# Patient Record
(demographics unavailable — no encounter records)

---

## 2024-10-26 NOTE — HISTORY OF PRESENT ILLNESS
[FreeTextEntry1] : ROSEANN GARZON is a 67 year old M who is s/p reconstruction of perineum, with gracilis myocutaneous flap and perineoplasty. DOS 10/16/24.  Patient is accompanied by his wife, Margarita.  Patient is POD#10. This is his first post-op visit. Patient was admitted at [facility] 10/16-10/22/24.  Patient's visiting nurse reached out to the office re c/f wound dehiscence, collateral obtained from patient and his wife Margarita, additional photo reviewed via secure email, appears to be epidermolysis, prescribed silvadene for wound care, which patient has been applying. Patient reports staying in a reclined position, using a cushion for support.  At this time, denies cp, sob, subjective fever, chills, and sweats.

## 2024-10-26 NOTE — DISCUSSION/SUMMARY
[TextEntry] : ROSEANN GARZON is a 67 year old M who is s/p reconstruction of perineum, with gracilis myocutaneous flap and perineoplasty. DOS 10/16/24.  Patient is overall healing well.  - Continue local wound care to perineum: apply silvadene cream + abd pad - OK to shower, get the incisions wet, pat dry, do not scrub - Continue to monitor JULIO drain output from right lower quadrant - Activity restrictions reviewed, OK to ambulate - F/u 2-3 weeks

## 2024-10-26 NOTE — PHYSICAL EXAM
[TextEntry] : Perineum: flap appears viable, no gross signs of infection, mild epidermolysis noted, silvadene cream applied + gauze + abd pad vicryl sutures intact, no open areas noted, no active bleeding or drainage from the wound  Left inner thigh: incision c/d/i, small open area to inferior aspect of incision from previous JULIO drain placement appears superficial, scant amt of serosang output, gauze pad applied  Abdomen: incision c/d/i, no gaps or open areas, staples intact +ostomy to Left mid quadrant with light brown liquid stool +JULIO drain to right lower quadrant with small amt of serous output

## 2024-10-26 NOTE — HISTORY OF PRESENT ILLNESS
[FreeTextEntry1] : ROSEANN GARZON is a 67 year old M who is s/p reconstruction of perineum, with gracilis myocutaneous flap and perineoplasty. DOS 10/16/24.  Patient is accompanied by his wife, Margarita.  Patient is POD#10. This is his first post-op visit. Patient was admitted at [facility] 10/16-10/22/24.  Patient's visiting nurse reached out to the office re c/f wound dehiscence, collateral obtained from patient and his wife Margarita, additional photo reviewed via secure email, appears to be epidermolysis, prescribed silvadene for wound care, which patient has been applying. Patient reports staying in a reclined position, using a cushion for support.  At this time, denies cp, sob, subjective fever, chills, and sweats.

## 2024-11-01 NOTE — PHYSICAL EXAM
[TextEntry] : Perineum: flapp appears viable, mild epidermolysis, no gross signs of infection, no surrounding erythema or drainage, silvadene cream + gauze + abd applied  Left inner thigh: incision c/d/i, previous open area from prior JULIO drain placement healed, no bleeding or drainage, prineo intact  Abdomen: incision c/d/i, +ostomy to left mi quadrant JULIO drain from right lower quadrant removed, pressure dressing applied

## 2024-11-01 NOTE — DISCUSSION/SUMMARY
[TextEntry] : ROSEANN GARZON is a 67 year old M who is s/p reconstruction of perineum, with gracilis myocutaneous flap and perineoplasty. DOS 10/16/24.  Patient is overall healing well. RLQ JULIO drain removed, pressure dressing applied.  - Continue local wound care to perineum: silvadene + abd daily and PRN when soiled - OK to shower, let the water run down, pat dry, do not scrub - Remove pressure dressing after 3 days - Activity restrictions reviewed, OK to sit for short period of time with donut cushion - F/u 3-4 weeks

## 2024-11-01 NOTE — HISTORY OF PRESENT ILLNESS
[FreeTextEntry1] : ROSEANN GARZON is a 67 year old M who is s/p reconstruction of perineum, with gracilis myocutaneous flap and perineoplasty. DOS 10/16/24.  Patient is accompanied by his wife, Margarita. Patient endorses tenderness to the JULIO insertion site, otherwise denies cp, sob, subjective fever, chills, and sweats. He has been applying silvadene to his perineal wound.  No drain log available for review however patient's wife reports minimal to no drainage for the past 2 days, plan to remove.

## 2024-11-07 NOTE — HISTORY OF PRESENT ILLNESS
[FreeTextEntry1] : 68 y/o M presents  PMH HTN, HLD, anemia, CAD, Anal SCC Medications: oral Mesalamine 4 tabs daily, mercaptopurine, rabeprazole, metronidazole 250 mg 4 tabs daily, losartan, atorvastatin, ASA 81 mg, metoprolol as needed (followed by Cardiologist Dr. Ramos) Former cigarette use x 40 years, quit in 2020 PSH appendectomy and right colon resection 2/2 ileitis (1970s), Coronary artery bypass x 3 (2020), cholecystectomy w/ Dr. Driscoll (3/2023), Diverting loop ileostomy creation  Father w/ stomach CA, mother w/ skin CA Last colonoscopy 2019 w/ Dr. Mccullough at [facility] in [place]  Per GI notes, h/o perianal fistulizing stricturing CD (diagnosed in 1978, h/o appendectomy and right colon resection, currently on Pentasa, 6MP, and metronidazole for 20+ years), Anal CA, s/p CRT September 2014 and radiation proctitis, followed by ONC Dr. Peewee Lynch.  Primary GI Dr. Mccullough, [facility] possible diverting colostomy in setting of fistulizing CD and stool incontinence in setting of radiation therapy.  Previous colonoscopy in 8/5/2019 revealed narrowing and ileum unable to be traversed per Dr. Mccullough who doesn't feel the lesion is amenable to dilation. Posterior anal fissure and internal hemorrhoids present. Radiation injury causing fibrosis noted in sigmoid colon and descending colon. With pediatric colonoscope, able to pass to cecum. Per report, transverse and right colon normal.  CTE completed 6/13/23 at [facility]: Stable postsurgical changes s/p ileocolic resection w/ reanastomosis. No bowel dilation or obstruction. Fat attentuation mural thickening involving neoterminal ileum, minimally changed. Oral contrast has not reached mid to distal colon which contained physiologic volume of stool. Chronic low attenuation sigmoid/rectal submucosal mural thickening minimally changed. Mild distal colonic diverticulosis. No acute inflammation. Peritoneum/Abdominal wall: post surgical changes along ventral wall  Per 8/2/23 note w/ GI [facility], discussed repeat endoscopy w/ possible dilation vs CRS consult for possible diversion. Advised given most pressing symptom is incontinence, dilation will not improve that symptom, referred to this office for consideration of diverting ostomy  Seen for initial consultation 8/24/23, reported h/o fistulas for several years which would intermittently discharge but have become active in the last year w/ reddish pus. Exam notable for right paramedian scar, periumbilical scar. Treatment options reviewed, patient to consider fecal diversion given symptomatology and prior history. Recommended pt consider proceeding with ileostomy to divert proximal to all areas of concern. Pt opted to consider his options and will contact office if he desires to proceed. Also opted to consult with his known surgeon to consider starting medication to treat post-cholecystectomy diarrhea.  Seen 09/12/2023 for f/u, patient interested in scheduling surgery for fecal diversion.  In interim, f/u with Ostomy nurses 09/25/2023 and 11/29/2023 for ostomy education.  S/p Diverting loop ileostomy creation 12/06/2023  Seen 12/27/23; Patient has been doing well. Consistency of ileostomy output varies, empties appliance at most 4 times per day. VNS assisting with ostomy education at home. On exam, abdomen soft, nontender, right paramedian scar, periumbilical scar, LLQ ileostomy pink and everted with green soft formed output, ostomy belt in place. Prescribed Imodium 2 mg 1 tab QID as needed for diarrhea  Pt follows w/ [facility] Dr. Peewee Lynch, CBC (2/15/24) noted hgb/hct 8.3/25.1, given Retacrit 2/9 and plans to continue q 4 weeks. Referred to Nephro Dr. Alejandro Washburn who pt saw yesterday, reports dehydrated and advised to start sodium bicarb supplements and advised to drink more juices and eat salty foods. Plans for Kidney US, scheduled at the end of March.  Seen 02/29/2024, for f/u, in interim had loop recorder placed and scheduled for cardiac ablation 3/6 at [facility]. Discussion of surgical management would be a total proctocolectomy via abdominal and perineal approaches with a permanent end ileostomy. Advised to f/u in 2-3 months while he gets medically optimized  Patient presents today in follow up. s/p cardiac ablation in 03/23 everything went well, saw Cardiologist about 2-3 weeks ago and had an Echo done which was normal. As per patient Losartan and Metoprolol was d/c after procedure. Denies anticoagulation. Patient also reports he had kidney ultrasound done and was told everything was okay.  Patient recently saw Heme Dr. Lynch for f/u on anemia, Hgb 10.8, Hct 33.6, .2, Creat. 1.27, Mg low (1.5) , AST 78 as per patient started on Sodium bicarbonate and Folic acid.  Last seen on 6/6/2024. Reports ostomy with frequent output needing to empty bag about 5-10x a day. Also reports having skin irritation due to leakage. Has seen Ostomy nurse and has been using OTC HC with some relief. States has been losing weight, he thinks due to high output. Since last visit has lost about 7-8 lbs. Taking imodium 3x/ day. Patient also reports anal fistulas have become active again since after surgery, reporting daily drainage (Yellowish, brown)  Remains on mesalamine and mercaptopurine. Exam noted right paramedian scar, periumbilical scar, LLQ ileostomy pink and everted with yellow/brown soft formed output, ostomy belt in place. Anorectal Exam noted minimal external hemorrhoids, multiple bilateral external openings consistent with complex perianal fistula. Surgical chat performed, patient consented for surgery.  10/16/24: s/p Total proctocolectomy with ileostomy and open lysis of intestinal adhesions by Dr. Chen and  s/p reconstruction of perineum, with gracilis myocutaneous flap and perineoplasty by Dr. Villalta Pathology: 1.  Abdominal colon and distal small bowel, resection: -   Mild chronic ileitis -   Mild chronic active colitis, consistent with history of Crohn'sdisease -   Negative for ulceration or dysplasia -   3 benign lymph nodes - Examination of the colon reveals features of chronic colitis including increased lymphoplasmacytic infiltrate in lamina propria, irregularly-shaped crypts, and crypt dropout.  Acute inflammation is also  present with rare crypt abscesses present.  Overall, the chronic active colitis is mild.  There is no dysplasia identified.  2.  Sigmoid colon, rectum, anus, excision: -   Mild chronic active colitis with focal erosions of surface mucosa, consistent with history of Crohn's disease -   Stenosis of colonic lumen -   Focus in anal canal with extensive fibrosis and cytologic atypia (radiation atypia), consistent with tumor bed site -   Perianal skin with chronic inflammation -   Fibrous adhesions -   Inflammatory, partially calcified debris present in colonic lumen (grossly identified is pseudopolyps) -   Negative for ulceration or dysplasia -   10 benign lymph nodes -   Examination of the colon reveals the same microscopic features seen in specimen 1.  A fibrotic tumor bed is located in the anal canal and entirely submitted (patient has history of anal cancer, status post chemotherapy and radiation).  Patient was discharged home on 10/22/24.  Patient seen for post op follow up and wound care by Dr. Villalta on 10/26 and 11/1,  Incision site healing well. RLQ JULIO drain removed by Dr. Villalta on 11/1 due to reported low JULIO output. Plan to continue local wound care to perineum: silvadene and ABD    Patient presents today for post op follow up. Admits to poor appetite. Denies N/V. Able to tolerate food.   States had a slight temperature last night up to 101. Took some tynenol and resolved.   Initially had pain at RLQ JULIO site last week. Was removed by Dr. Villalta and pain resolved.  Currently main complaint of rectal pain. Pain is 7/10. Denies taking any pain medication today.  Usually will take a percocet at bed time. Was prescribed 30 day supply of percocet by hematologist.   Reports visiting nurse contacted Dr. Villalta's office this past Monday 11/4/24 with concerns of infection and necrosis in the perineal incision site.  Reports slight brown drainage in the perineal incision site. Has been applying silvadene.  Has follow up visit with Dr. Villalta 11/9/24.  Reports good ostomy output. Stool is brown and pasty.  Is currently managing ostomy care well with his wife.

## 2024-11-07 NOTE — PHYSICAL EXAM
[FreeTextEntry1] : Medical assistant present for duration of physical examination  General no acute distress, alert and oriented Psych responding appropriately to questions Comfortable in chair Ambulating with and without assistance Nonlabored breathing Abdomen soft, nontender, right paramedian scar, periumbilical scar, LLQ ileostomy pink and everted with yellow/brown soft formed output, ostomy belt in place, midline incision intact. Staples removed and steri strips applied   Perineal Exam: Inspection no cellulitis , sloughing of superficial layer with pink healthy appearing tissue underneath, vicryl sutures with some separation of wound edges    Patient tolerated examination well.

## 2024-11-07 NOTE — HISTORY OF PRESENT ILLNESS
[FreeTextEntry1] : 68 y/o M presents  PMH HTN, HLD, anemia, CAD, Anal SCC Medications: oral Mesalamine 4 tabs daily, mercaptopurine, rabeprazole, metronidazole 250 mg 4 tabs daily, losartan, atorvastatin, ASA 81 mg, metoprolol as needed (followed by Cardiologist Dr. Ramos) Former cigarette use x 40 years, quit in 2020 PSH appendectomy and right colon resection 2/2 ileitis (1970s), Coronary artery bypass x 3 (2020), cholecystectomy w/ Dr. Driscoll (3/2023), Diverting loop ileostomy creation  Father w/ stomach CA, mother w/ skin CA Last colonoscopy 2019 w/ Dr. Mccullough at [facility] in [place]  Per GI notes, h/o perianal fistulizing stricturing CD (diagnosed in 1978, h/o appendectomy and right colon resection, currently on Pentasa, 6MP, and metronidazole for 20+ years), Anal CA, s/p CRT September 2014 and radiation proctitis, followed by ONC Dr. Peewee Lynch.  Primary GI Dr. Mccullough, [facility] of possible diverting colostomy in setting of fistulizing CD and stool incontinence in setting of radiation therapy.  Previous colonoscopy in 8/5/2019 revealed narrowing and ileum unable to be traversed per Dr. Mccullough who doesn't feel the lesion is amenable to dilation. Posterior anal fissure and internal hemorrhoids present. Radiation injury causing fibrosis noted in sigmoid colon and descending colon. With pediatric colonoscope, able to pass to cecum. Per report, transverse and right colon normal.  CTE completed 6/13/23 at [facility]: Stable postsurgical changes s/p ileocolic resection w/ reanastomosis. No bowel dilation or obstruction. Fat attentuation mural thickening involving neoterminal ileum, minimally changed. Oral contrast has not reached mid to distal colon which contained physiologic volume of stool. Chronic low attenuation sigmoid/rectal submucosal mural thickening minimally changed. Mild distal colonic diverticulosis. No acute inflammation. Peritoneum/Abdominal wall: post surgical changes along ventral wall  Per 8/2/23 note w/ GI [facility], discussed repeat endoscopy w/ possible dilation vs CRS consult for possible diversion. Advised given most pressing symptom is incontinence, dilation will not improve that symptom, referred to this office for consideration of diverting ostomy  Seen for initial consultation 8/24/23, reported h/o fistulas for several years which would intermittently discharge but have become active in the last year w/ reddish pus. Exam notable for right paramedian scar, periumbilical scar. Treatment options reviewed, patient to consider fecal diversion given symptomatology and prior history. Recommended pt consider proceeding with ileostomy to divert proximal to all areas of concern. Pt opted to consider his options and will contact office if he desires to proceed. Also opted to consult with his known surgeon to consider starting medication to treat post-cholecystectomy diarrhea.  Seen 09/12/2023 for f/u, patient interested in scheduling surgery for fecal diversion.  In interim, f/u with Ostomy nurses 09/25/2023 and 11/29/2023 for ostomy education.  S/p Diverting loop ileostomy creation 12/06/2023  Seen 12/27/23; Patient has been doing well. Consistency of ileostomy output varies, empties appliance at most 4 times per day. VNS assisting with ostomy education at home. On exam, abdomen soft, nontender, right paramedian scar, periumbilical scar, LLQ ileostomy pink and everted with green soft formed output, ostomy belt in place. Prescribed Imodium 2 mg 1 tab QID as needed for diarrhea  Pt follows w/ [facility] Dr. Peewee Lynch, CBC (2/15/24) noted hgb/hct 8.3/25.1, given Retacrit 2/9 and plans to continue q 4 weeks. Referred to Nephro Dr. Alejandro Washburn who pt saw yesterday, reports dehydrated and advised to start sodium bicarb supplements and advised to drink more juices and eat salty foods. Plans for Kidney US, scheduled at the end of March.  Seen 02/29/2024, for f/u, in interim had loop recorder placed and scheduled for cardiac ablation 3/6 at [facility]. Discussion of surgical management would be a total proctocolectomy via abdominal and perineal approaches with a permanent end ileostomy. Advised to f/u in 2-3 months while he gets medically optimized  Patient presents today in follow up. s/p cardiac ablation in 03/23 everything went well, saw Cardiologist about 2-3 weeks ago and had an Echo done which was normal. As per patient Losartan and Metoprolol was d/c after procedure. Denies anticoagulation. Patient also reports he had kidney ultrasound done and was told everything was okay.  Patient recently saw Heme Dr. Lynch for f/u on anemia, Hgb 10.8, Hct 33.6, .2, Creat. 1.27, Mg low (1.5) , AST 78 as per patient started on Sodium bicarbonate and Folic acid.  Last seen on 6/6/2024. Reports ostomy with frequent output needing to empty bag about 5-10x a day. Also reports having skin irritation due to leakage. Has seen Ostomy nurse and has been using OTC HC with some relief. States has been losing weight, he thinks due to high output. Since last visit has lost about 7-8 lbs. Taking imodium 3x/ day. Patient also reports anal fistulas have become active again since after surgery, reporting daily drainage (Yellowish, brown)  Remains on mesalamine and mercaptopurine. Exam noted right paramedian scar, periumbilical scar, LLQ ileostomy pink and everted with yellow/brown soft formed output, ostomy belt in place. Anorectal Exam noted minimal external hemorrhoids, multiple bilateral external openings consistent with complex perianal fistula. Surgical chat performed, patient consented for surgery.  10/16/24: s/p Total proctocolectomy with ileostomy and open lysis of intestinal adhesions by Dr. Chen and  s/p reconstruction of perineum, with gracilis myocutaneous flap and perineoplasty by Dr. Villalta Pathology: 1.  Abdominal colon and distal small bowel, resection: -   Mild chronic ileitis -   Mild chronic active colitis, consistent with history of Crohn'sdisease -   Negative for ulceration or dysplasia -   3 benign lymph nodes - Examination of the colon reveals features of chronic colitis including increased lymphoplasmacytic infiltrate in lamina propria, irregularly-shaped crypts, and crypt dropout.  Acute inflammation is also  present with rare crypt abscesses present.  Overall, the chronic active colitis is mild.  There is no dysplasia identified.  2.  Sigmoid colon, rectum, anus, excision: -   Mild chronic active colitis with focal erosions of surface mucosa, consistent with history of Crohn's disease -   Stenosis of colonic lumen -   Focus in anal canal with extensive fibrosis and cytologic atypia (radiation atypia), consistent with tumor bed site -   Perianal skin with chronic inflammation -   Fibrous adhesions -   Inflammatory, partially calcified debris present in colonic lumen (grossly identified is pseudopolyps) -   Negative for ulceration or dysplasia -   10 benign lymph nodes -   Examination of the colon reveals the same microscopic features seen in specimen 1.  A fibrotic tumor bed is located in the anal canal and entirely submitted (patient has history of anal cancer, status post chemotherapy and radiation).  Patient was discharged home on 10/22/24.  Patient seen for post op follow up and wound care by Dr. Villalta on 10/26 and 11/1,  Incision site healing well. RLQ JULIO drain removed by Dr. Villalta on 11/1 due to reported low JULIO output. Plan to continue local wound care to perineum: silvadene and ABD    Patient presents today for post op follow up. Admits to poor appetite. Denies N/V. Able to tolerate food.   States had a slight temperature last night up to 101. Took some tynenol and resolved.   Initially had pain at RLQ JULIO site last week. Was removed by Dr. Villalta and pain resolved.  Currently main complaint of rectal pain. Pain is 7/10. Denies taking any pain medication today.  Usually will take a percocet at bed time. Was prescribed 30 day supply of percocet by hematologist.   Reports visiting nurse contacted Dr. Villalta's office this past Monday 11/4/24 with concerns of infection and necrosis in the perineal incision site.  Reports slight brown drainage in the perineal incision site. Has been applying silvadene.  Has follow up visit with Dr. Villalta 11/9/24.  Reports good ostomy output. Stool is brown and pasty.  Is currently managing ostomy care well with his wife.

## 2024-11-07 NOTE — ASSESSMENT
[FreeTextEntry1] : Pathology discussed Continue to liberalize diet Continue to avoid heavy lifting and strenuous activity for 6-8 weeks postop Ostomy care. Local wound care per plastic surgery - has follow up 11/9/24 Telehealth 1 month. All questions were answered, patient expressed understanding, and is agreeable to this plan.

## 2024-11-11 NOTE — DISCUSSION/SUMMARY
[TextEntry] : ROSEANN GARZON is a 67 year old M who is s/p reconstruction of perineum, with gracilis myocutaneous flap and perineoplasty. DOS 10/16/24.  Patient is overall healing well. Skin paddle debrided with viable muscle noted.   - Continue local wound care to perineum: wet-to-dry dressing BID - OK to shower, let the water run down, pat dry - Ok to sit - PT - F/u 3-4 weeks

## 2024-11-11 NOTE — PHYSICAL EXAM
[TextEntry] : Perineum: flap skin is necrotic, debrided today, viable muscle, no gross signs of infection, no surrounding erythema  wet-to dry dressing applied  Left inner thigh: incision c/d/i, previous with small open area at middle of incision with serous drainage, prineo removed. rest of the incision cdi, no fluctuance  Abdomen: incision c/d/i, +ostomy to left mid quadrant

## 2024-11-11 NOTE — HISTORY OF PRESENT ILLNESS
[FreeTextEntry1] : ROSEANN GARZON is a 67 year old M who is s/p reconstruction of perineum, with gracilis myocutaneous flap and perineoplasty. DOS 10/16/24.  Patient is accompanied by his wife, Margarita. Patient had follow up with Dr Chen earlier this week, Reports drainage from perineum, has been applying silvadene. Reports drainage from thigh incision.  Denies fever, chills

## 2024-11-23 NOTE — HISTORY OF PRESENT ILLNESS
[FreeTextEntry1] : ROSEANN GARZON is a 67 year old M who is s/p reconstruction of perineum, with gracilis myocutaneous flap and perineoplasty. DOS 10/16/24.  Patient is accompanied by his wife, Margarita. Patient continues to endorse scant amt of drainage from his left incision, otherwise eating better, getting his appetite back, denies cp, sob, subjective fever, chills, and sweats. Patient self-discontinued PO antibiotics due to myalgia and diarrhea, which resolved after he stopped the abx.

## 2024-11-23 NOTE — DISCUSSION/SUMMARY
[TextEntry] : ROSEANN GARZON is a 67 year old M who is s/p reconstruction of perineum, with gracilis myocutaneous flap and perineoplasty. DOS 10/16/24.  Patient is overall healing well. Perineum skin paddle with healthy granulation tissue.   - Continue local wound care to perineum: wet-to-dry dressing BID - OK to shower, let the water run down, pat dry - Ok to sit - Continue home PT - F/u 3-4 weeks

## 2024-12-04 NOTE — END OF VISIT
Caller: Estella Kaur    Relationship: Self         What was the call regarding: CONFLICT           [Time Spent: ___ minutes] : I have spent [unfilled] minutes of time on the encounter which excludes teaching and separately reported services.

## 2024-12-04 NOTE — PHYSICAL EXAM
[Alert] : alert [Normal Voice/Communication] : normal voice/communication [Healthy Appearing] : healthy appearing [Well Developed] : well developed

## 2024-12-06 NOTE — CONSULT LETTER
[Dear  ___] : Dear  [unfilled], [Consult Letter:] : I had the pleasure of evaluating your patient, [unfilled]. [Please see my note below.] : Please see my note below. [Consult Closing:] : Thank you very much for allowing me to participate in the care of this patient.  If you have any questions, please do not hesitate to contact me. [Sincerely,] : Sincerely, [DrDelmar  ___] : Dr. GVAIN [FreeTextEntry3] : Jorgito Land MD\par  Professor of Medicine\par  Chief of GI\par  Director IBD Program\par  [facility]\par

## 2024-12-06 NOTE — CONSULT LETTER
[Dear  ___] : Dear  [unfilled], [Consult Letter:] : I had the pleasure of evaluating your patient, [unfilled]. [Please see my note below.] : Please see my note below. [Consult Closing:] : Thank you very much for allowing me to participate in the care of this patient.  If you have any questions, please do not hesitate to contact me. [Sincerely,] : Sincerely, [DrDelmar  ___] : Dr. GAVIN [FreeTextEntry3] : Jorgito Land MD\par  Professor of Medicine\par  Chief of GI\par  Director IBD Program\par  [facility]\par

## 2024-12-06 NOTE — HISTORY OF PRESENT ILLNESS
[Home] : at home, [unfilled] , at the time of the visit. [Medical Office: ([facility])___] : at the medical office located in  [Verbal consent obtained from patient] : the patient, [unfilled] [FreeTextEntry1] : This is a 67 year old male with perianal fistulizing stricturing Crohn's Disease (diagnosed 1978, right sided colon resection in the past, currently off his prior treatment Pentasa, 6MP, and metronidazole for 20+ years), anal cancer (completed CT/RT in September 2014), and radiation proctitis who presents today s/p surgery on 10/14/24, to discuss medical management of IBD.  10/14/24:  Underwent Total proctocolectomy with ileostomy and open lysis of adhesions by Dr. Chen and s/p reconstruction of perineum with gracilis myocutaneous flap and perineoplasty by Dr. Villalta   Pt reports since surgery, he reports a slow recovery but overall feeling well. Some soreness remains at the rectal flap site. Ostomy output ~ 800-900 mL today, stable. No skin manifestations. No abd pain. No fevers/chills. Tolerating PO. His wife assists with his medical care and meds. Denies narcotic use, using Tylenol for pain management. Able to walk outside a little. Has been off his IBD meds since surgery on 10/14/24. Reports he's had recurrent skin cancers over the last 10 years. Goals are to minimize IBD meds and avoid skin cancer.   PATH:  Specimen(s) Submitted 1  Abdominal colon and distal small bowel 2  Sigmoid colon, rectum and anus  Final Diagnosis 1.  Abdominal colon and distal small bowel, resection: -   Mild chronic ileitis -   Mild chronic active colitis, consistent with history of Crohn's  disease -   Negative for ulceration or dysplasia -   3 benign lymph nodes -   See note  Note: Examination of the colon reveals features of chronic colitis including increased lymphoplasmacytic infiltrate in lamina propria, irregularly-shaped crypts, and crypt dropout.  Acute inflammation is also present with rare crypt abscesses present.  Overall, the chronic active colitis is mild.  There is no dysplasia identified.  2.  Sigmoid colon, rectum, anus, excision: -   Mild chronic active colitis with focal erosions of surface mucosa, consistent with history of Crohn's disease -   Stenosis of colonic lumen -   Focus in anal canal with extensive fibrosis and cytologic atypia (radiation atypia), consistent with tumor bed site -   Perianal skin with chronic inflammation -   Fibrous adhesions -   Inflammatory, partially calcified debris present in colonic lumen (grossly identified is pseudopolyps) -   Negative for ulceration or dysplasia -   10 benign lymph nodes -   See note no  Note: Examination of the colon reveals the same microscopic features seen in specimen 1.  A fibrotic tumor bed is located in the anal canal and entirely submitted (patient has history of anal cancer, status post chemotherapy and radiation).   to discuss his CTE results. Patient has been referred to our clinic by Dr. Mccullough (gastroenterology) for a second opinion in the discussion of possible diverting colostomy given complicated history with fistulizing Crohn's disease as well as stool incontinence in the setting of radiation therapy. CTE from 6/13/2023 demonstrates no evidence for metastatic disease or acute inflammatory changes. When discussing symptoms, he describes having increasing stool frequency/incontinence. Typically lasts until around noon, but now lasts well into the dinner hour. Bowel movements are non-bloody and non-painful. Denies nausea, vomiting, fever, or extraintestinal manifestations.

## 2024-12-06 NOTE — ASSESSMENT
[FreeTextEntry1] : This is a 67 year old male with perianal fistulizing stricturing Crohn's Disease (diagnosed 1978, right sided colon resection in the past, currently off his prior treatment Pentasa, 6MP, and metronidazole for 20+ years), anal cancer (completed CT/RT in September 2014), and radiation proctitis who presents today s/p surgery on 10/14/24, to discuss medical management of IBD.  10/14/24:  Underwent Total proctocolectomy with ileostomy and open lysis of adhesions by Dr. Chen and s/p reconstruction of perineum with gracilis myocutaneous flap and perineoplasty by Dr. Villalta   Plan:  - given evidence of ileitis on pathology and severity of history of Crohn's involving small bowel as well, advised that he restart treatment to prevent long-term recurrence of IBD  - advised he undergo baseline Video Capsule Endoscopy to establish baseline IBD activity prior to starting therapy; confirmed with Dr. Chen no contraindications due to ileostomy status - discussed risk/benefit of therapy briefly however he will consult with his primary GI to discuss further options - we advise he consider Ustekinumab (Stelara) in consideration of his IBD and his overall goals  - will need prebiologic work up (Hepatitis panel, TB) with Dr. Mccullough prior to starting  - avoid 6MP and anti-TNF given history of skin cancer - avoid ALBINO-inhibitors given significant cardiac history of triple bypass  - RD referral to assist with gaining weight since surgery and overall IBD nutrition   #History of anal cancer (likely Squamous Cell Ca) in 2014 - Reports surveillance via lab work q 3 months with Oncologist  - Oncologist: Dr. Peewee Lynch   #Anemia - B12 and iron deficient- being f/b Heme/Onc Dr. Lynch  Follow up 6 months to check in after he discusses above plan and implementation with Dr. Mccullough  Coordinated care with Dr. Chen and Dr. Mccullough personally.

## 2024-12-06 NOTE — HISTORY OF PRESENT ILLNESS
[Home] : at home, [unfilled] , at the time of the visit. [Medical Office: ([facility])___] : at the medical office located in  [Verbal consent obtained from patient] : the patient, [unfilled] [FreeTextEntry1] : This is a 67 year old male with perianal fistulizing stricturing Crohn's Disease (diagnosed 1978, right sided colon resection in the past, currently off his prior treatment Pentasa, 6MP, and metronidazole for 20+ years), anal cancer (completed CT/RT in September 2014), and radiation proctitis who presents today s/p surgery on 10/14/24, to discuss medical management of IBD.  10/14/24:  Underwent Total proctocolectomy with ileostomy and open lysis of adhesions by Dr. Chen and s/p reconstruction of perineum with gracilis myocutaneous flap and perineoplasty by Dr. Villalta   Pt reports since surgery, he reports a slow recovery but overall feeling well. Some soreness remains at the rectal flap site. Ostomy output ~ 800-900 mL today, stable. No skin manifestations. No abd pain. No fevers/chills. Tolerating PO. His wife assists with his medical care and meds. Denies narcotic use, using Tylenol for pain management. Able to walk outside a little. Has been off his IBD meds since surgery on 10/14/24. Reports he's had recurrent skin cancers over the last 10 years. Goals are to minimize IBD meds and avoid skin cancer.   PATH:  Specimen(s) Submitted 1  Abdominal colon and distal small bowel 2  Sigmoid colon, rectum and anus  Final Diagnosis 1.  Abdominal colon and distal small bowel, resection: -   Mild chronic ileitis -   Mild chronic active colitis, consistent with history of Crohn's  disease -   Negative for ulceration or dysplasia -   3 benign lymph nodes -   See note  Note: Examination of the colon reveals features of chronic colitis including increased lymphoplasmacytic infiltrate in lamina propria, irregularly-shaped crypts, and crypt dropout.  Acute inflammation is also present with rare crypt abscesses present.  Overall, the chronic active colitis is mild.  There is no dysplasia identified.  2.  Sigmoid colon, rectum, anus, excision: -   Mild chronic active colitis with focal erosions of surface mucosa, consistent with history of Crohn's disease -   Stenosis of colonic lumen -   Focus in anal canal with extensive fibrosis and cytologic atypia (radiation atypia), consistent with tumor bed site -   Perianal skin with chronic inflammation -   Fibrous adhesions -   Inflammatory, partially calcified debris present in colonic lumen (grossly identified is pseudopolyps) -   Negative for ulceration or dysplasia -   10 benign lymph nodes -   See note no  Note: Examination of the colon reveals the same microscopic features seen in specimen 1.  A fibrotic tumor bed is located in the anal canal and entirely submitted (patient has history of anal cancer, status post chemotherapy and radiation).   to discuss his CTE results. Patient has been referred to our clinic by Dr. Mccullough (gastroenterology) for a second opinion in the discussion of possible diverting colostomy given complicated history with fistulizing Crohn's disease as well as stool incontinence in the setting of radiation therapy. CTE from 6/13/2023 demonstrates no evidence for metastatic disease or acute inflammatory changes. When discussing symptoms, he describes having increasing stool frequency/incontinence. Typically lasts until around noon, but now lasts well into the dinner hour. Bowel movements are non-bloody and non-painful. Denies nausea, vomiting, fever, or extraintestinal manifestations.

## 2024-12-15 NOTE — REVIEW OF SYSTEMS
[de-identified] : sensitive, tender possibly infected wound site on top right buttock  [FreeTextEntry1] : A detailed set of ROS were asked and negative except those outlined in HPI.

## 2024-12-15 NOTE — HISTORY OF PRESENT ILLNESS
[FreeTextEntry1] : ROSEANN GARZON is a 67 year old M who is s/p reconstruction of perineum, with gracilis myocutaneous flap and perineoplasty. DOS 10/16/24.  Patient is accompanied by his wife, Margarita.  Patient reports a "pimple" in the wound area, +sensitive to touch, wakes him up from sleep. Patient has been applying aquaphor to the wound, visiting nurse has been performing his dressing changes. Reports the thigh incision is improved. Patient endorses fear of wound dehiscence and does not sit on bowl? Patient also reports BLE swelling with feet skin cracks, s/p US lower extremity, neg for DVT.  Patient was evaluated by GI Dr. Land for possible initiation of biologic ustekinumab for IBD.  At this time, denies cp, sob, subjective fever, chills, and sweats.

## 2024-12-15 NOTE — REVIEW OF SYSTEMS
[de-identified] : sensitive, tender possibly infected wound site on top right buttock  [FreeTextEntry1] : A detailed set of ROS were asked and negative except those outlined in HPI.

## 2024-12-15 NOTE — PHYSICAL EXAM
[TextEntry] : Perineum: flap skin with healthy granulation tissue, no necrosis visible,  +area of erythema superior to right buttock, appears to be localized, WTD dressing applied + abd pad  Left inner thigh: incision is well healing, no fluctuance, low suspicion of fluid collection

## 2024-12-15 NOTE — ASSESSMENT
[FreeTextEntry1] : ROSEANN GARZON is a 67 year old M who is s/p reconstruction of perineum, with gracilis myocutaneous flap and perineoplasty. DOS 10/16/24.  Patient is overall healing well. Perineum skin paddle with healthy granulation tissue.  Localized area of infection to right buttock, rec abx.   - Continue local wound care to perineum: wet-to-dry dressing BID - OK to shower, let the water run down, pat dry - Ok to sit, rec using donut cushion for exgtra support - Advised elevate legs and use compression socks for b/l lower extremity swelling, continue moisturizing feet - Continue home PT - PO abx x 7 days - F/u 3-4 weeks

## 2024-12-15 NOTE — ADDENDUM
[FreeTextEntry1] : This note was authored by Sabi De working as a scribe for Dr.Victor Villalta.  I, Kd Villalta NP, am scribing for and in the presence of Dr. Trung Villalta MD, the following sections: HISTORY OF PRESENT ILLNESS, PAST MEDICAL/FAMILY/SOCIAL HISTORY, REVIEW OF SYSTEMS, VITRAL SIGNS, PHYSICAL EXAM, & DISPOSITION.  I personally performed the services described in the documentation, reviewed the documentation recorded by the scribe in my presence and it accurately and completely records my words and actions.

## 2024-12-15 NOTE — ADDENDUM
[FreeTextEntry1] : This note was authored by Sabi De working as a scribe for Dr.Victor Villalta.  I, Kd Villlata NP, am scribing for and in the presence of Dr. Trung Villalta MD, the following sections: HISTORY OF PRESENT ILLNESS, PAST MEDICAL/FAMILY/SOCIAL HISTORY, REVIEW OF SYSTEMS, VITRAL SIGNS, PHYSICAL EXAM, & DISPOSITION.  I personally performed the services described in the documentation, reviewed the documentation recorded by the scribe in my presence and it accurately and completely records my words and actions.

## 2024-12-15 NOTE — REVIEW OF SYSTEMS
[de-identified] : sensitive, tender possibly infected wound site on top right buttock  [FreeTextEntry1] : A detailed set of ROS were asked and negative except those outlined in HPI.

## 2025-01-11 NOTE — ADDENDUM
[FreeTextEntry1] : I, Jennifer Lange, documented this note as a scribe on behalf of Dr. Trung Villalta MD on 01/11/2025.  I, Kd Villalta NP, am scribing for and in the presence of Dr. Trung Villalta MD, the following sections: HISTORY OF PRESENT ILLNESS, PAST MEDICAL/FAMILY/SOCIAL HISTORY, REVIEW OF SYSTEMS, VITAL SIGNS, PHYSICAL EXAM, & DISPOSITION.   I personally performed the services described in the documentation, reviewed the documentation recorded by the NP/scribe in my presence and it accurately and completely records my words and actions.

## 2025-01-11 NOTE — ASSESSMENT
[FreeTextEntry1] : ROSEANN GARZON is a 67 year old M who is s/p reconstruction of perineum, with gracilis myocutaneous flap and perineoplasty. DOS 10/16/24.  Patient is healing well. Perineum skin paddle with significant re-epithelialization and overall improvement.  Exam findings discussed with patient, high risk for developing pressure ulcer, rec alternating positions q2h, avoid sitting for prolonged periods of time, use donut cushion, etc.  - Offered physical therapy, patient will let us know - Local wound care to superficial open areas: apply bacitracin ointment + abd pad - F/u 2-3 months

## 2025-01-11 NOTE — HISTORY OF PRESENT ILLNESS
[FreeTextEntry1] : ROSEANN GARZON is a 67 year old M who is s/p reconstruction of perineum, with gracilis myocutaneous flap and perineoplasty. DOS 10/16/24.   Patient is accompanied by his wife.  Patient was recently admitted at [facility] in  for bacteremia, source was his chemoport which has since been removed, discharged on IV abx x 2 weeks.  At this time, patient endorses overall improvement of the wound with minimal drainage, denies cp, sob, subjective fever, chills, and sweats.

## 2025-01-11 NOTE — PHYSICAL EXAM
[TextEntry] : Perineum: flap skin with >85% re-epithelialization, incisions healing well, no necrosis small area of erythema to right side of sacrum bone, suspect early pressure sore,   Sacrum: skin with stigmata of xrt, small circular open areas with no active drainage, appear superficial  Left inner thigh: incision is well healing, no fluctuance,

## 2025-01-11 NOTE — HISTORY OF PRESENT ILLNESS
[FreeTextEntry1] : ROSEANN GARZON is a 67 year old M who is s/p reconstruction of perineum, with gracilis myocutaneous flap and perineoplasty. DOS 10/16/24.   Patient is accompanied by his wife.  Patient was recently admitted at  in  for bacteremia, source was his chemoport which has since been removed, discharged on IV abx x 2 weeks.  At this time, patient endorses overall improvement of the wound with minimal drainage, denies cp, sob, subjective fever, chills, and sweats.

## 2025-02-20 NOTE — PROCEDURE
[FreeTextEntry1] : 1) eczema to bilateral ankles: aseptic surgical debridement performed to lesions, patient recommended use of topical moisturizer daily with manual scrubbing nightly and repeat regimen consistently until cleared 2) Onychomycosis with elongated painful toenails Left and Right hallux: aseptic debridement of elongated mycotic painful toenails x 2  Patient tolerated all treatments well and without any complications

## 2025-02-20 NOTE — ASSESSMENT
[FreeTextEntry1] : 1) eczema to bilateral ankles: aseptic surgical debridement performed to lesions, patient recommended use of topical moisturizer daily with manual scrubbing nightly and repeat regimen consistently until cleared 2) Onychomycosis with elongated painful toenails Left and Right hallux: aseptic debridement of elongated mycotic painful toenails x 2 3) Hammertoes: discussed use of proper fit high toe box shoes with accommodative insoles, check feet for any new lesions daily, seek treatment immediately if present  Patient tolerated all treatments well and without any complications Follow up in 2 months [Verbal] : verbal [Patient] : patient [Good - alert, interested, motivated] : Good - alert, interested, motivated [Verbalizes knowledge/Understanding] : Verbalizes knowledge/understanding [Skin Care] : skin care [Pt responsibility to plan of care] : patient responsibility to plan of care

## 2025-02-20 NOTE — REASON FOR VISIT
[Follow-Up Visit] : a follow-up visit for [FreeTextEntry2] : eczema to bilateral ankles, onychomycosis to Left and Right hallux, loydaes

## 2025-02-20 NOTE — PHYSICAL EXAM
[General Appearance - Alert] : alert [General Appearance - In No Acute Distress] : in no acute distress [General Appearance - Well Nourished] : well nourished [General Appearance - Well Developed] : well developed [General Appearance - Well-Appearing] : healthy appearing [] : normal voice and communication [Delayed in the Right Toes] : capillary refills delayed in the right toes [Delayed in the Left Toes] : capillary refills delayed in the left toes [Skin Turgor] : normal skin turgor [Foot Ulcer] : no foot ulcer [Position Sense Dec.] : diminished position sense at the level of the toes [Oriented To Time, Place, And Person] : oriented to person, place, and time [Impaired Insight] : insight and judgment were intact [Affect] : the affect was normal

## 2025-02-20 NOTE — HISTORY OF PRESENT ILLNESS
[FreeTextEntry1] : Mr. ROSEANN GARZON is a 68 year M who is seen in the office for eczema to bilateral ankles, onychomycosis to Left and Right hallux, hammertoes. Patient denies any current or recent f/c/n/v/sob/chest pain. AAOx3 and in NAD.

## 2025-03-29 NOTE — ASSESSMENT
[FreeTextEntry1] : ROSEANN GARZON is a 68 year old M who is s/p reconstruction of perineum, with gracilis myocutaneous flap and perineoplasty. DOS  03/29/2025  Patient is overall healing well. Perineum skin paddle with healthy granulation tissue.  Localized area of infection to right buttock, rec abx.   - Continue local wound care to perineum: wet-to-dry dressing BID - OK to shower, let the water run down, pat dry - Ok to sit, rec using donut cushion for extra support - Advised elevate legs and use compression socks for b/l lower extremity swelling, continue moisturizing feet - C/o fistula in perineum area. - Continue monitoring Flare ups, watch for improvement on Stelera - Offered re-excision, reviewed surgical options - F/u 2-6 weeks or sooner PRN

## 2025-03-29 NOTE — HISTORY OF PRESENT ILLNESS
[FreeTextEntry1] : ROSEANN GARZON is a 67 year old M who is s/p reconstruction of perineum, with gracilis myocutaneous flap and perineoplasty. DOS  03/29/2025.  Patient is accompanied by his wife, Margarita.  Patient reports a "pimple" in the wound area, +sensitive to touch, wakes him up from sleep. Patient has been applying aquaphor to the wound.  Pt reports occasional discharge.  Pt seen by Dr Gastelum, seth Shen for Crohn's Patient also reports BLE swelling with feet skin cracks, s/p US lower extremity, neg for DVT.  At this time, denies cp, sob, subjective fever, chills, and sweats.

## 2025-03-29 NOTE — ADDENDUM
[FreeTextEntry1] : I, Marimar Tomas, acted solely as a scribe for ISAC Mendieta on 3/21/2025 . All medical entries made by the Scribe were at my, ISAC Mendieta's, direction and personally dictated by me on 03/29/2025 . I have reviewed the chart and agree that the record accurately reflects my personal performance of the history, physical exam, assessment and plan. I have also personally directed, reviewed, and agreed with the chart.

## 2025-04-23 NOTE — PHYSICAL EXAM
[General Appearance - Alert] : alert [General Appearance - In No Acute Distress] : in no acute distress [General Appearance - Well Nourished] : well nourished [General Appearance - Well Developed] : well developed [General Appearance - Well-Appearing] : healthy appearing [Ankle Swelling On The Left] : moderate [Delayed in the Right Toes] : capillary refills delayed in the right toes [Delayed in the Left Toes] : capillary refills delayed in the left toes [] : normal strength/tone [de-identified] : ROM of ankle, subtalar, midtarsal, MPJ, IPJ are adequate and nontender bilateral 5/5 muscle power in inversion, eversion, dorsiflexion, plantarflexion bilateral [Skin Turgor] : normal skin turgor [Foot Ulcer] : no foot ulcer [Position Sense Dec.] : diminished position sense at the level of the toes [FreeTextEntry1] : gross epicritic sensation to feet diminished, light touch sensation intact, sharp/dull sensation intact [Oriented To Time, Place, And Person] : oriented to person, place, and time [Impaired Insight] : insight and judgment were intact [Affect] : the affect was normal

## 2025-04-23 NOTE — REVIEW OF SYSTEMS
[Joint Swelling] : no joint swelling [Joint Stiffness] : no joint stiffness [Limb Pain] : no limb pain [Limb Swelling] : no limb swelling [Skin Lesions] : skin lesion [Skin Wound] : no skin wound [Itching] : no itching [Dry Skin] : dry skin [Confused] : no confusion [Convulsions] : no convulsions [Dizziness] : no dizziness [Fainting] : no fainting [Negative] : Psychiatric

## 2025-05-02 NOTE — ASSESSMENT
[FreeTextEntry1] : This is a 67 year old male with perianal fistulizing stricturing Crohn's Disease (diagnosed 1978, right sided colon resection in the past, currently off his prior treatment Pentasa, 6MP, and metronidazole for 20+ years), anal cancer (completed CT/RT in September 2014), and radiation proctitis who presents today s/p surgery on 10/14/24, and VCE Jan 2025 showing small bowel disease, now sp 1st infusion of UST.   10/14/24:  Underwent Total proctocolectomy with ileostomy and open lysis of adhesions by Dr. Chen and s/p reconstruction of perineum with gracilis myocutaneous flap and perineoplasty by Dr. Villalta  VCE Jan 2025 with small bowel disease.   Plan:  - given evidence of ileitis on pathology and severity of history of Crohn's involving small bowel as well, advised that he restart treatment to prevent long-term recurrence of IBD  - now S/P UST loading, plan for first maintenance; we advised pt use [employer] for RN administration of medication to assist with cpay coverage but pt prefers to use [facility] for at home delivery of syringes - avoid 6MP and anti-TNF given history of skin cancer - avoid ALBINO-inhibitors given significant cardiac history of triple bypass  - RD referral to assist with gaining weight since surgery and overall IBD nutrition  -plan for endoscopic eval 6 mo after starting treatment; spoke with dr scruggs and he prefers to do ileoscopy next month and then again 6 mo later   #History of anal cancer (likely Squamous Cell Ca) in 2014 - Reports surveillance via lab work q 3 months with Oncologist  - Oncologist: Dr. Peewee Lynch   #Anemia - B12 and iron deficient- being f/b Heme/Onc Dr. Lynch  Follow up as needed - Dr. Scruggs advised pt f/u with IBD team here annually

## 2025-05-02 NOTE — HISTORY OF PRESENT ILLNESS
[Home] : at home, [unfilled] , at the time of the visit. [Medical Office: ([facility])___] : at the medical office located in  [Verbal consent obtained from patient] : the patient, [unfilled] [FreeTextEntry1] : This is a 68 year old male with perianal fistulizing stricturing Crohn's Disease (diagnosed 1978, right sided colon resection in the past, currently off his prior treatment Pentasa, 6MP, and metronidazole for 20+ years), anal cancer (completed CT/RT in September 2014), and radiation proctitis who presents today s/p surgery on 10/14/24, now s/p 1st infusion of UST feeling well.   Pt denies any ADRs to UST, feeling overall well. Received at [place]. No labs to review.   VCE Jan 2025: small bowel capsule showed enlarged ulcerations presented throughout the small bowel. ulcerations of varying sizes present. small erosions and microsize bleeding erosions present throughout small bowel.   10/14/24:  Underwent Total proctocolectomy with ileostomy and open lysis of adhesions by Dr. Chen and s/p reconstruction of perineum with gracilis myocutaneous flap and perineoplasty by Dr. Villalta   Pt reports since surgery, he reports a slow recovery but overall feeling well. Some soreness remains at the rectal flap site. Ostomy output ~ 800-900 mL today, stable. No skin manifestations. No abd pain. No fevers/chills. Tolerating PO. His wife assists with his medical care and meds. Denies narcotic use, using Tylenol for pain management. Able to walk outside a little. Has been off his IBD meds since surgery on 10/14/24. Reports he's had recurrent skin cancers over the last 10 years. Goals are to minimize IBD meds and avoid skin cancer.   PATH:  Specimen(s) Submitted 1  Abdominal colon and distal small bowel 2  Sigmoid colon, rectum and anus  Final Diagnosis 1.  Abdominal colon and distal small bowel, resection: -   Mild chronic ileitis -   Mild chronic active colitis, consistent with history of Crohn's  disease -   Negative for ulceration or dysplasia -   3 benign lymph nodes -   See note  Note: Examination of the colon reveals features of chronic colitis including increased lymphoplasmacytic infiltrate in lamina propria, irregularly-shaped crypts, and crypt dropout.  Acute inflammation is also present with rare crypt abscesses present.  Overall, the chronic active colitis is mild.  There is no dysplasia identified.  2.  Sigmoid colon, rectum, anus, excision: -   Mild chronic active colitis with focal erosions of surface mucosa, consistent with history of Crohn's disease -   Stenosis of colonic lumen -   Focus in anal canal with extensive fibrosis and cytologic atypia (radiation atypia), consistent with tumor bed site -   Perianal skin with chronic inflammation -   Fibrous adhesions -   Inflammatory, partially calcified debris present in colonic lumen (grossly identified is pseudopolyps) -   Negative for ulceration or dysplasia -   10 benign lymph nodes -   See note no  Note: Examination of the colon reveals the same microscopic features seen in specimen 1.  A fibrotic tumor bed is located in the anal canal and entirely submitted (patient has history of anal cancer, status post chemotherapy and radiation).   to discuss his CTE results. Patient has been referred to our clinic by Dr. Mccullough (gastroenterology) for a second opinion in the discussion of possible diverting colostomy given complicated history with fistulizing Crohn's disease as well as stool incontinence in the setting of radiation therapy. CTE from 6/13/2023 demonstrates no evidence for metastatic disease or acute inflammatory changes. When discussing symptoms, he describes having increasing stool frequency/incontinence. Typically lasts until around noon, but now lasts well into the dinner hour. Bowel movements are non-bloody and non-painful. Denies nausea, vomiting, fever, or extraintestinal manifestations.

## 2025-05-02 NOTE — CONSULT LETTER
[Dear  ___] : Dear  [unfilled], [Consult Letter:] : I had the pleasure of evaluating your patient, [unfilled]. [Please see my note below.] : Please see my note below. [Consult Closing:] : Thank you very much for allowing me to participate in the care of this patient.  If you have any questions, please do not hesitate to contact me. [Sincerely,] : Sincerely, [DrDelmar  ___] : Dr. GAVIN [FreeTextEntry3] : Jorgito Land MD\par  Professor of Medicine\par  Chief of GI\par  Director IBD Program\par  [facility]\par

## 2025-07-17 NOTE — PHYSICAL EXAM
[FreeTextEntry1] :  Medical assistant present for duration of physical examination  General no acute distress, alert and oriented Psych calm, pleasant demeanor, responding appropriately to question Thin Comfortable in chair Ambulating without assistance Nonlabored breathing Abdomen soft, nondistended, nontender, no rebound or guarding, midline laparotomy incision intact, blister of skin near umbilicus near ostomy appliance, LLQ JULIO with serous fluid in tubing, minimal in drain Perineum - right buttock JULIO with serous fluid in tubing, minimal in drain  Right buttock/pelvic pigtail removed today. Patient tolerated well and dressing applied. 
No

## 2025-07-17 NOTE — HISTORY OF PRESENT ILLNESS
[FreeTextEntry1] : 67 y/o M presents for follow up   PMH HTN, HLD, anemia, CAD, Anal SCC  Medications: oral Mesalamine 4 tabs daily, mercaptopurine, rabeprazole, metronidazole 250 mg 4 tabs daily, losartan, atorvastatin, ASA 81 mg, metoprolol as needed (followed by Cardiologist Dr. Ramos) Former cigarette use x 40 years, quit in 2020  PSH appendectomy and right colon resection 2/2 ileitis (1970s), Coronary artery bypass x 3 (2020), cholecystectomy w/ Dr. Driscoll (3/2023), Diverting loop ileostomy creation  Father w/ stomach CA, mother w/ skin CA Last colonoscopy 2019 w/ Dr. Mccullough at [facility] in [place]  Per GI notes, h/o perianal fistulizing stricturing CD (diagnosed in 1978, h/o appendectomy and right colon resection, currently on Pentasa, 6MP, and metronidazole for 20+ years), Anal CA, s/p CRT September 2014 and radiation proctitis, followed by ONC Dr. Peewee Lynch.  Primary GI Dr. Mccullough, referred to [facility] for 2nd opinion of possible diverting colostomy in setting of fistulizing CD and stool incontinence in setting of radiation therapy.  Previous colonoscopy in 8/5/2019 revealed narrowing and ileum unable to be traversed per Dr. Mccullough who doesn't feel the lesion is amenable to dilation. Posterior anal fissure and internal hemorrhoids present. Radiation injury causing fibrosis noted in sigmoid colon and descending colon. With pediatric colonoscope, able to pass to cecum. Per report, transverse and right colon normal.  CTE completed 6/13/23 at [facility]: Stable postsurgical changes s/p ileocolic resection w/ reanastomosis. No bowel dilation or obstruction. Fat attentuation mural thickening involving neoterminal ileum, minimally changed. Oral contrast has not reached mid to distal colon which contained physiologic volume of stool. Chronic low attenuation sigmoid/rectal submucosal mural thickening minimally changed. Mild distal colonic diverticulosis. No acute inflammation. Peritoneum/Abdominal wall: post surgical changes along ventral wall  Per 8/2/23 note w/ [facility], discussed repeat endoscopy w/ possible dilation vs CRS consult for possible diversion. Advised given most pressing symptom is incontinence, dilation will not improve that symptom, referred to this office for consideration of diverting ostomy  Seen for initial consultation 8/24/23, reported h/o fistulas for several years which would intermittently discharge but have become active in the last year w/ reddish pus. Exam notable for right paramedian scar, periumbilical scar. Treatment options reviewed, patient to consider fecal diversion given symptomatology and prior history. Recommended pt consider proceeding with ileostomy to divert proximal to all areas of concern. Pt opted to consider his options and will contact office if he desires to proceed. Also opted to consult with his known surgeon to consider starting medication to treat post-cholecystectomy diarrhea.  Seen 09/12/2023 for f/u, patient interested in scheduling surgery for fecal diversion.  In interim, f/u with Ostomy nurses 09/25/2023 and 11/29/2023 for ostomy education.  S/p Diverting loop ileostomy creation 12/06/2023  Seen 12/27/23; Patient has been doing well. Consistency of ileostomy output varies, empties appliance at most 4 times per day. VNS assisting with ostomy education at home. On exam, abdomen soft, nontender, right paramedian scar, periumbilical scar, LLQ ileostomy pink and everted with green soft formed output, ostomy belt in place. Prescribed Imodium 2 mg 1 tab QID as needed for diarrhea  Pt follows w/ [facility] Dr. Peewee Lynch, CBC (2/15/24) noted hgb/hct 8.3/25.1, given Retacrit 2/9 and plans to continue q 4 weeks. Referred to Nephro Dr. Alejandro Washburn who pt saw yesterday, reports dehydrated and advised to start sodium bicarb supplements and advised to drink more juices and eat salty foods. Plans for Kidney US, scheduled at the end of March.  Seen 02/29/2024, for f/u, in interim had loop recorder placed and scheduled for cardiac ablation 3/6 at [facility]. Discussion of surgical management would be a total proctocolectomy via abdominal and perineal approaches with a permanent end ileostomy. Advised to f/u in 2-3 months while he gets medically optimized  Patient presents today in follow up. s/p cardiac ablation in 03/23 everything went well, saw Cardiologist about 2-3 weeks ago and had an Echo done which was normal. As per patient Losartan and Metoprolol was d/c after procedure. Denies anticoagulation. Patient also reports he had kidney ultrasound done and was told everything was okay.  Patient recently saw Heme Dr. Lynch for f/u on anemia, Hgb 10.8, Hct 33.6, .2, Creat. 1.27, Mg low (1.5) , AST 78 as per patient started on Sodium bicarbonate and Folic acid.  Last seen on 6/6/2024. Reports ostomy with frequent output needing to empty bag about 5-10x a day. Also reports having skin irritation due to leakage. Has seen Ostomy nurse and has been using OTC HC with some relief. States has been losing weight, he thinks due to high output. Since last visit has lost about 7-8 lbs. Taking imodium 3x/ day. Patient also reports anal fistulas have become active again since after surgery, reporting daily drainage (Yellowish, brown) Remains on mesalamine and mercaptopurine. Exam noted right paramedian scar, periumbilical scar, LLQ ileostomy pink and everted with yellow/brown soft formed output, ostomy belt in place. Anorectal Exam noted minimal external hemorrhoids, multiple bilateral external openings consistent with complex perianal fistula. Surgical chat performed, patient consented for surgery.  10/16/24: s/p Total proctocolectomy with ileostomy and open lysis of intestinal adhesions by Dr. Chen and s/p reconstruction of perineum, with gracilis myocutaneous flap and perineoplasty by Dr. Villalta Pathology: 1. Abdominal colon and distal small bowel, resection: - Mild chronic ileitis - Mild chronic active colitis, consistent with history of Crohn'sdisease - Negative for ulceration or dysplasia - 3 benign lymph nodes - Examination of the colon reveals features of chronic colitis including increased lymphoplasmacytic infiltrate in lamina propria, irregularly-shaped crypts, and crypt dropout. Acute inflammation is also present with rare crypt abscesses present. Overall, the chronic active colitis is mild. There is no dysplasia identified.  2. Sigmoid colon, rectum, anus, excision: - Mild chronic active colitis with focal erosions of surface mucosa, consistent with history of Crohn's disease - Stenosis of colonic lumen - Focus in anal canal with extensive fibrosis and cytologic atypia (radiation atypia), consistent with tumor bed site - Perianal skin with chronic inflammation - Fibrous adhesions - Inflammatory, partially calcified debris present in colonic lumen (grossly identified is pseudopolyps) - Negative for ulceration or dysplasia - 10 benign lymph nodes - Examination of the colon reveals the same microscopic features seen in specimen 1. A fibrotic tumor bed is located in the anal canal and entirely submitted (patient has history of anal cancer, status post chemotherapy and radiation).  Patient was discharged home on 10/22/24.  Patient seen for post op follow up and wound care by Dr. Villalta on 10/26 and 11/1, Incision site healing well. RLQ JULIO drain removed by Dr. Villalta on 11/1 due to reported low JULIO output. Plan to continue local wound care to perineum: silvadene and ABD  Recently transferred from OSH for SBO, patient started after having ileoscopy with Dr. Mccullough on 5/19/25 for reassessment of patients Crohn's/stelara initiation. Since ileoscopy patient c/o reduced ileostomy output  CT A/P performed at OSH revealed SBO with transition point in the infraumbilical region. Patient admitted to [facility] for non operative management 6/2/25- 6/27/25  NGT placed with 800 cc feculent output, series of X rays performed with incomplete passage of all contrast.   PICC line placed started on TPN 6/4/25  S/p Exploratory laparotomy with ARABELLA and small bowel resection 6/9/25  Surgical Pathology Report - Auth (Verified) Specimen(s) Submitted 1  Ileum 2  Omentum  Final Diagnosis 1.  Ileum: -   Small intestinal resection with extensive serosal fibrous adhesions with areas of organizing acute and chronic serositis and fibrosis extending into mesentery -    Small intestinal wall and resection margins are viable -   Separate small segment of small intestine with focal serosal fibrous adhesion  2.  Omentum: -   Adipose tissue with areas of fibrosis Verified by: Caty Pierson MD  Post operatively patient continued to have diffuse abdominal pain. CT A/P 6/18/25 revealed two large abdominal collections, possible abscess, IR consulted and drains x 2 placed with 60 ccs of red tinged fluid aspirated from LLQ collection and 200 cc purulent fluid from the pelvic collection.   Staples removed 6/23/25  TPN halved as patient transitioned to LRD, PICC Lines removed prior to discharge.  IR R drain reposition on 6/27/25 in pelvis no new puncture site 10 cc aspirated and cultures sent 6/19/25 LLQ collection cultures no growth at 5 days. Rare gram negative rods per oil power field.  Pelvic collection cultures (+) rare enterococcus faecium, sensitive to ampicillin, gentamicin, and vancomycin.   CT A/P performed at [facility] on 7/17/25 PROCEDURE: CT of the Abdomen and Pelvis was performed. Sagittal and coronal reformats were performed.  FINDINGS: LOWER CHEST: Resolved pleural effusion. Minimal residual scattered linear atelectasis. Coronary artery calcifications. Left chest wall loop recorder.  LIVER: Within normal limits. BILE DUCTS: Unchanged mild postcholecystectomy intra and extra hepatic biliary dilatation. GALLBLADDER: Surgically removed. SPLEEN: Within normal limits. PANCREAS: Within normal limits. ADRENALS: Within normal limits. KIDNEYS/URETERS: Within normal limits.  BLADDER: Within normal limits. REPRODUCTIVE ORGANS: Prostate within normal limits.  BOWEL: Normal caliber small bowel loops. Unremarkable left-sided ileostomy. Slightly decreased postsurgical fat necrosis in midline pelvic cavity status post proctocolectomy. PERITONEUM/RETROPERITONEUM: There are 2 fluid collections: 2 x 2 x 2 cm thick-walled rim-enhancing fluid collection in anterior left lower quadrant abdominal cavity at site of drain pigtail (3:83) 2 x 1.5 x 2 cm thick-walled rim-enhancing fluid collection in midline presacral soft tissues, the right sided cutaneous transgluteal approach catheter courses along this fluid collection, no collection at the pigtail along the left pelvic sidewall. VESSELS: Heavily calcified arterial vasculature. LYMPH NODES: No lymphadenopathy. ABDOMINAL WALL: Postsurgical changes with open wound midline ventral subcutaneous tissues. Stable appearance abdominal perineal resection. BONES: Degenerative changes.  IMPRESSION: Significantly decreased, residual 2 cm fluid collection at left lower quadrant drain pigtail. Significantly decreased, residual 2 cm fluid collection in midline presacral region along course of right percutaneous gluteal approach drain, right pelvic sidewall fluid collection has resolved at the level of the pigtail.  Patient presents today for follow up  Feels both drains are bothersome particularly the LLQ feels constant discomfort Previously had VNS performing dressing changes 2x per week, now VNS only assisting dressing change once a week. Patients' wife now completes dressing changes every other day.  Minimal output from drains.  Appetite has been slowly improving.  Seen by [facility] 7/8/25 at [place] -Dr. Peewee Lynch  Due for next Stelara and Retacrit on 7/21/25  Reports ostomy output functioning well.  Has upcoming with Dr. Land on 7/23/25

## 2025-07-17 NOTE — ASSESSMENT
[FreeTextEntry1] : CT reviewed Recommend serial removal of pigtail drains. Right buttock/pelvic pigtail removed today.  F/u 2 weeks for possible removal of LLQ drain.  Signs/symptoms to monitor for post drain removal reviewed. If occur advised to seek acute evaluation All questions answered.

## 2025-07-25 NOTE — ASSESSMENT
[FreeTextEntry1] : This is a 68 year old male with perianal fistulizing stricturing Crohn's Disease (diagnosed 1978, right sided colon resection in the past, currently off his prior treatment Pentasa, 6MP, and metronidazole for 20+ years), anal cancer (completed CT/RT in September 2014), and radiation proctitis, s/p proctocolectomy and ileostomy on 10/14/24 open lysis of adhesions by Dr. Chen and s/p reconstruction of perineum with gracilis myocutaneous flap and perineoplasty by Dr. Villalta.  Had been recovering nicely post op.   Appears to have suffered from  pSBO immediately after ileoscopy. Intra op the appearance was c/w microperforation and required additional resection --> SBR; post-op course c/b pelvic abscesses requiring 2 IR-guided drains; here today reporting very slow recovery, s/p 3 doses of UST.   #Crohn's disease s/p post proc perf leading to SBR - given evidence of ileitis on pathology and severity of history of Crohn's involving small bowel as well, pt was placed on Ustekinumab, now s/p 3 doses, for post op prevention - consider VCE/patency vs ileoscopy at least 6 mo from now after pt recovers to ensure no IBD recurrence  - avoid 6MP and anti-TNF given history of skin cancer - avoid ALBINO-inhibitors given significant cardiac history of triple bypass  - RD referral to assist with gaining weight since surgery  - place bacitracin on drain site - buproprion to assist with abd pain, early satiety  #History of anal cancer (likely Squamous Cell Ca) in 2014 - Reports surveillance via lab work q 3 months with Oncologist  - Oncologist: Dr. Peewee Lynch   #Anemia - B12 and iron deficient- being f/b Heme/Onc Dr. Lynch  #Mood -His wife feels he's depressed and we addressed whether he'd be open to discussion with therapist; he declined.   Follow up 1 mo via TEB   I, Dr. Jorgito Land, personally performed the evaluation and management (E/M) services for this established patient who presents today with (a) new problem(s)/exacerbation of (an) existing condition(s). That E/M includes conducting the clinically appropriate interval history &/or exam, assessing all new/exacerbated conditions, and establishing a new plan of care. Today, my YOLANDA, Suzanna TcLexplique - /l?k â€¢ splik/, was here to observe my evaluation and management service for this new problem/exacerbated condition and follow the plan of care established by me going forward.

## 2025-07-25 NOTE — PHYSICAL EXAM

## 2025-07-25 NOTE — HISTORY OF PRESENT ILLNESS
[FreeTextEntry1] : This is a 68 year old male with perianal fistulizing stricturing Crohn's Disease (diagnosed 1978, right sided colon resection in the past, currently off his prior treatment Pentasa, 6MP, and metronidazole for 20+ years), anal cancer (completed CT/RT in September 2014), and radiation proctitis, s/p proctocolectomy and end ileostomy on 10/14/24, now 3 weeks post-discharge from hospital for iatrogenic perforation during ileoscopy with [facility] Dr. Mccullough, leading to SBO --> SBR; post-op course c/b pelvic abscesses requiring 2 IR-guided drains; here today reporting very slow recovery, s/p 3 doses of UST.   7/23/25: Pt reports overall recovering, happy to be out of the hospital. Some cramping in abdomen that is ongoing and unchanged; ostomy output <1L per day. On Imodium 3 per day. No fever/chills. No nausea/vomiting. Took Stelara on Monday.  Reports he has early satiety leading to not eating enough. Has not gained weight since hospital discharge.   Inpatient course: Recently transferred to [facility] from OSH for SBO, patient started after having ileoscopy with Dr. Mccullough on 5/19/25 for reassessment of patients Crohn's/stelara initiation. Since ileoscopy patient c/o reduced ileostomy output CT A/P performed at OSH revealed SBO with transition point in the infraumbilical region. Patient admitted to [facility] for non operative management 6/2/25- 6/27/25 NGT placed with 800 cc feculent output, series of X rays performed with incomplete passage of all contrast. PICC line placed started on TPN 6/4/25 S/p Exploratory laparotomy with ARABELLA and small bowel resection 6/9/25 Surgical Pathology Report - Auth (Verified) Specimen(s) Submitted 1 Ileum 2 Omentum  Final Diagnosis 1. Ileum: - Small intestinal resection with extensive serosal fibrous adhesions with areas of organizing acute and chronic serositis and fibrosis extending into mesentery - Small intestinal wall and resection margins are viable - Separate small segment of small intestine with focal serosal fibrous adhesion  2. Omentum: - Adipose tissue with areas of fibrosis Verified by: Caty Pierson MD  Post operatively patient continued to have diffuse abdominal pain. CT A/P 6/18/25 revealed two large abdominal collections, possible abscess, IR consulted and drains x 2 placed with 60 ccs of red tinged fluid aspirated from LLQ collection and 200 cc purulent fluid from the pelvic collection.  VCE Jan 2025: small bowel capsule showed enlarged ulcerations presented throughout the small bowel. ulcerations of varying sizes present. small erosions and microsize bleeding erosions present throughout small bowel.   10/14/24:  Underwent Total proctocolectomy with ileostomy and open lysis of adhesions by Dr. Chen and s/p reconstruction of perineum with gracilis myocutaneous flap and perineoplasty by Dr. Villalta   Pt reports since surgery, he reports a slow recovery but overall feeling well. Some soreness remains at the rectal flap site. Ostomy output ~ 800-900 mL today, stable. No skin manifestations. No abd pain. No fevers/chills. Tolerating PO. His wife assists with his medical care and meds. Denies narcotic use, using Tylenol for pain management. Able to walk outside a little. Has been off his IBD meds since surgery on 10/14/24. Reports he's had recurrent skin cancers over the last 10 years. Goals are to minimize IBD meds and avoid skin cancer.   PATH:  Specimen(s) Submitted 1  Abdominal colon and distal small bowel 2  Sigmoid colon, rectum and anus  Final Diagnosis 1.  Abdominal colon and distal small bowel, resection: -   Mild chronic ileitis -   Mild chronic active colitis, consistent with history of Crohn's  disease -   Negative for ulceration or dysplasia -   3 benign lymph nodes -   See note  Note: Examination of the colon reveals features of chronic colitis including increased lymphoplasmacytic infiltrate in lamina propria, irregularly-shaped crypts, and crypt dropout.  Acute inflammation is also present with rare crypt abscesses present.  Overall, the chronic active colitis is mild.  There is no dysplasia identified.  2.  Sigmoid colon, rectum, anus, excision: -   Mild chronic active colitis with focal erosions of surface mucosa, consistent with history of Crohn's disease -   Stenosis of colonic lumen -   Focus in anal canal with extensive fibrosis and cytologic atypia (radiation atypia), consistent with tumor bed site -   Perianal skin with chronic inflammation -   Fibrous adhesions -   Inflammatory, partially calcified debris present in colonic lumen (grossly identified is pseudopolyps) -   Negative for ulceration or dysplasia -   10 benign lymph nodes -   See note no  Note: Examination of the colon reveals the same microscopic features seen in specimen 1.  A fibrotic tumor bed is located in the anal canal and entirely submitted (patient has history of anal cancer, status post chemotherapy and radiation).   to discuss his CTE results. Patient has been referred to our clinic by Dr. Mccullough (gastroenterology) for a second opinion in the discussion of possible diverting colostomy given complicated history with fistulizing Crohn's disease as well as stool incontinence in the setting of radiation therapy. CTE from 6/13/2023 demonstrates no evidence for metastatic disease or acute inflammatory changes. When discussing symptoms, he describes having increasing stool frequency/incontinence. Typically lasts until around noon, but now lasts well into the dinner hour. Bowel movements are non-bloody and non-painful. Denies nausea, vomiting, fever, or extraintestinal manifestations.

## 2025-07-25 NOTE — PHYSICAL EXAM
[Alert] : alert [Normal Voice/Communication] : normal voice/communication [Healthy Appearing] : healthy appearing [No Acute Distress] : no acute distress [Sclera] : the sclera and conjunctiva were normal [Hearing Threshold Finger Rub Not [place]] : hearing was normal [Normal Lips/Gums] : the lips and gums were normal [Oropharynx] : the oropharynx was normal [Normal Appearance] : the appearance of the neck was normal [No Neck Mass] : no neck mass was observed [No Respiratory Distress] : no respiratory distress [No Acc Muscle Use] : no accessory muscle use [Respiration, Rhythm And Depth] : normal respiratory rhythm and effort [Auscultation Breath Sounds / Voice Sounds] : lungs were clear to auscultation bilaterally [Heart Rate And Rhythm] : heart rate was normal and rhythm regular [Normal S1, S2] : normal S1 and S2 [Murmurs] : no murmurs [Bowel Sounds] : normal bowel sounds [Abdomen Tenderness] : non-tender [No Masses] : no abdominal mass palpated [Abdomen Soft] : soft [] : no hepatosplenomegaly [Oriented To Time, Place, And Person] : oriented to person, place, and time [de-identified] : RLQ ileostomy, LLQ drain, serous drainage; both sites CD+ I with gauze and tape

## 2025-07-25 NOTE — HISTORY OF PRESENT ILLNESS
[FreeTextEntry1] : This is a 68 year old male with perianal fistulizing stricturing Crohn's Disease (diagnosed 1978, right sided colon resection in the past, currently off his prior treatment Pentasa, 6MP, and metronidazole for 20+ years), anal cancer (completed CT/RT in September 2014), and radiation proctitis, s/p proctocolectomy and end ileostomy on 10/14/24, now 3 weeks post-discharge from hospital for iatrogenic perforation during ileoscopy with [facility] Dr. Mccullough, leading to SBO --> SBR; post-op course c/b pelvic abscesses requiring 2 IR-guided drains; here today reporting very slow recovery, s/p 3 doses of UST.   7/23/25: Pt reports overall recovering, happy to be out of the hospital. Some cramping in abdomen that is ongoing and unchanged; ostomy output <1L per day. On Imodium 3 per day. No fever/chills. No nausea/vomiting. Took Stelara on Monday.  Reports he has early satiety leading to not eating enough. Has not gained weight since hospital discharge.   Inpatient course: Recently transferred to [facility] from OSH for SBO, patient started after having ileoscopy with Dr. Mccullough on 5/19/25 for reassessment of patients Crohn's/stelara initiation. Since ileoscopy patient c/o reduced ileostomy output CT A/P performed at OSH revealed SBO with transition point in the infraumbilical region. Patient admitted to [facility] for non operative management 6/2/25- 6/27/25 NGT placed with 800 cc feculent output, series of X rays performed with incomplete passage of all contrast. PICC line placed started on TPN 6/4/25 S/p Exploratory laparotomy with ARABELLA and small bowel resection 6/9/25 Surgical Pathology Report - Auth (Verified) Specimen(s) Submitted 1 Ileum 2 Omentum  Final Diagnosis 1. Ileum: - Small intestinal resection with extensive serosal fibrous adhesions with areas of organizing acute and chronic serositis and fibrosis extending into mesentery - Small intestinal wall and resection margins are viable - Separate small segment of small intestine with focal serosal fibrous adhesion  2. Omentum: - Adipose tissue with areas of fibrosis Verified by: Caty Pierson MD  Post operatively patient continued to have diffuse abdominal pain. CT A/P 6/18/25 revealed two large abdominal collections, possible abscess, IR consulted and drains x 2 placed with 60 ccs of red tinged fluid aspirated from LLQ collection and 200 cc purulent fluid from the pelvic collection.  VCE Jan 2025: small bowel capsule showed enlarged ulcerations presented throughout the small bowel. ulcerations of varying sizes present. small erosions and microsize bleeding erosions present throughout small bowel.   10/14/24:  Underwent Total proctocolectomy with ileostomy and open lysis of adhesions by Dr. Chen and s/p reconstruction of perineum with gracilis myocutaneous flap and perineoplasty by Dr. Villalta   Pt reports since surgery, he reports a slow recovery but overall feeling well. Some soreness remains at the rectal flap site. Ostomy output ~ 800-900 mL today, stable. No skin manifestations. No abd pain. No fevers/chills. Tolerating PO. His wife assists with his medical care and meds. Denies narcotic use, using Tylenol for pain management. Able to walk outside a little. Has been off his IBD meds since surgery on 10/14/24. Reports he's had recurrent skin cancers over the last 10 years. Goals are to minimize IBD meds and avoid skin cancer.   PATH:  Specimen(s) Submitted 1  Abdominal colon and distal small bowel 2  Sigmoid colon, rectum and anus  Final Diagnosis 1.  Abdominal colon and distal small bowel, resection: -   Mild chronic ileitis -   Mild chronic active colitis, consistent with history of Crohn's  disease -   Negative for ulceration or dysplasia -   3 benign lymph nodes -   See note  Note: Examination of the colon reveals features of chronic colitis including increased lymphoplasmacytic infiltrate in lamina propria, irregularly-shaped crypts, and crypt dropout.  Acute inflammation is also present with rare crypt abscesses present.  Overall, the chronic active colitis is mild.  There is no dysplasia identified.  2.  Sigmoid colon, rectum, anus, excision: -   Mild chronic active colitis with focal erosions of surface mucosa, consistent with history of Crohn's disease -   Stenosis of colonic lumen -   Focus in anal canal with extensive fibrosis and cytologic atypia (radiation atypia), consistent with tumor bed site -   Perianal skin with chronic inflammation -   Fibrous adhesions -   Inflammatory, partially calcified debris present in colonic lumen (grossly identified is pseudopolyps) -   Negative for ulceration or dysplasia -   10 benign lymph nodes -   See note no  Note: Examination of the colon reveals the same microscopic features seen in specimen 1.  A fibrotic tumor bed is located in the anal canal and entirely submitted (patient has history of anal cancer, status post chemotherapy and radiation).   to discuss his CTE results. Patient has been referred to our clinic by Dr. Mccullough (gastroenterology) for a second opinion in the discussion of possible diverting colostomy given complicated history with fistulizing Crohn's disease as well as stool incontinence in the setting of radiation therapy. CTE from 6/13/2023 demonstrates no evidence for metastatic disease or acute inflammatory changes. When discussing symptoms, he describes having increasing stool frequency/incontinence. Typically lasts until around noon, but now lasts well into the dinner hour. Bowel movements are non-bloody and non-painful. Denies nausea, vomiting, fever, or extraintestinal manifestations.

## 2025-07-25 NOTE — CONSULT LETTER
[Dear  ___] : Dear  [unfilled], [Consult Letter:] : I had the pleasure of evaluating your patient, [unfilled]. [Please see my note below.] : Please see my note below. [Consult Closing:] : Thank you very much for allowing me to participate in the care of this patient.  If you have any questions, please do not hesitate to contact me. [Sincerely,] : Sincerely, [DrDelmar  ___] : Dr. GAVIN [Courtesy Letter:] : I had the pleasure of seeing your patient, [unfilled], in my office today. [FreeTextEntry3] : Jorgito Land MD\par  Professor of Medicine\par  Chief of GI\par  Director IBD Program\par  [facility]\par   [DrDelmar ___] : Dr. GAVIN

## 2025-07-25 NOTE — HISTORY OF PRESENT ILLNESS
[FreeTextEntry1] : This is a 68 year old male with perianal fistulizing stricturing Crohn's Disease (diagnosed 1978, right sided colon resection in the past, currently off his prior treatment Pentasa, 6MP, and metronidazole for 20+ years), anal cancer (completed CT/RT in September 2014), and radiation proctitis, s/p proctocolectomy and end ileostomy on 10/14/24, now 3 weeks post-discharge from hospital for iatrogenic perforation during ileoscopy with [facility] Dr. Mccullough, leading to SBO --> SBR; post-op course c/b pelvic abscesses requiring 2 IR-guided drains; here today reporting very slow recovery, s/p 3 doses of UST.   7/23/25: Pt reports overall recovering, happy to be out of the hospital. Some cramping in abdomen that is ongoing and unchanged; ostomy output <1L per day. On Imodium 3 per day. No fever/chills. No nausea/vomiting. Took Stelara on Monday.  Reports he has early satiety leading to not eating enough. Has not gained weight since hospital discharge.   Inpatient course: Recently transferred to [facility] from OSH for SBO, patient started after having ileoscopy with Dr. Mccullough on 5/19/25 for reassessment of patients Crohn's/stelara initiation. Since ileoscopy patient c/o reduced ileostomy output CT A/P performed at OSH revealed SBO with transition point in the infraumbilical region. Patient admitted to [facility] for non operative management 6/2/25- 6/27/25 NGT placed with 800 cc feculent output, series of X rays performed with incomplete passage of all contrast. PICC line placed started on TPN 6/4/25 S/p Exploratory laparotomy with ARABELLA and small bowel resection 6/9/25 Surgical Pathology Report - Auth (Verified) Specimen(s) Submitted 1 Ileum 2 Omentum  Final Diagnosis 1. Ileum: - Small intestinal resection with extensive serosal fibrous adhesions with areas of organizing acute and chronic serositis and fibrosis extending into mesentery - Small intestinal wall and resection margins are viable - Separate small segment of small intestine with focal serosal fibrous adhesion  2. Omentum: - Adipose tissue with areas of fibrosis Verified by: Caty Pierson MD  Post operatively patient continued to have diffuse abdominal pain. CT A/P 6/18/25 revealed two large abdominal collections, possible abscess, IR consulted and drains x 2 placed with 60 ccs of red tinged fluid aspirated from LLQ collection and 200 cc purulent fluid from the pelvic collection.  VCE Jan 2025: small bowel capsule showed enlarged ulcerations presented throughout the small bowel. ulcerations of varying sizes present. small erosions and microsize bleeding erosions present throughout small bowel.   10/14/24:  Underwent Total proctocolectomy with ileostomy and open lysis of adhesions by Dr. Chen and s/p reconstruction of perineum with gracilis myocutaneous flap and perineoplasty by Dr. Villalta   Pt reports since surgery, he reports a slow recovery but overall feeling well. Some soreness remains at the rectal flap site. Ostomy output ~ 800-900 mL today, stable. No skin manifestations. No abd pain. No fevers/chills. Tolerating PO. His wife assists with his medical care and meds. Denies narcotic use, using Tylenol for pain management. Able to walk outside a little. Has been off his IBD meds since surgery on 10/14/24. Reports he's had recurrent skin cancers over the last 10 years. Goals are to minimize IBD meds and avoid skin cancer.   PATH:  Specimen(s) Submitted 1  Abdominal colon and distal small bowel 2  Sigmoid colon, rectum and anus  Final Diagnosis 1.  Abdominal colon and distal small bowel, resection: -   Mild chronic ileitis -   Mild chronic active colitis, consistent with history of Crohn's  disease -   Negative for ulceration or dysplasia -   3 benign lymph nodes -   See note  Note: Examination of the colon reveals features of chronic colitis including increased lymphoplasmacytic infiltrate in lamina propria, irregularly-shaped crypts, and crypt dropout.  Acute inflammation is also present with rare crypt abscesses present.  Overall, the chronic active colitis is mild.  There is no dysplasia identified.  2.  Sigmoid colon, rectum, anus, excision: -   Mild chronic active colitis with focal erosions of surface mucosa, consistent with history of Crohn's disease -   Stenosis of colonic lumen -   Focus in anal canal with extensive fibrosis and cytologic atypia (radiation atypia), consistent with tumor bed site -   Perianal skin with chronic inflammation -   Fibrous adhesions -   Inflammatory, partially calcified debris present in colonic lumen (grossly identified is pseudopolyps) -   Negative for ulceration or dysplasia -   10 benign lymph nodes -   See note no  Note: Examination of the colon reveals the same microscopic features seen in specimen 1.  A fibrotic tumor bed is located in the anal canal and entirely submitted (patient has history of anal cancer, status post chemotherapy and radiation).   to discuss his CTE results. Patient has been referred to our clinic by Dr. Mccullough (gastroenterology) for a second opinion in the discussion of possible diverting colostomy given complicated history with fistulizing Crohn's disease as well as stool incontinence in the setting of radiation therapy. CTE from 6/13/2023 demonstrates no evidence for metastatic disease or acute inflammatory changes. When discussing symptoms, he describes having increasing stool frequency/incontinence. Typically lasts until around noon, but now lasts well into the dinner hour. Bowel movements are non-bloody and non-painful. Denies nausea, vomiting, fever, or extraintestinal manifestations.

## 2025-07-25 NOTE — CONSULT LETTER
[Dear  ___] : Dear  [unfilled], [Consult Letter:] : I had the pleasure of evaluating your patient, [unfilled]. [Please see my note below.] : Please see my note below. [Consult Closing:] : Thank you very much for allowing me to participate in the care of this patient.  If you have any questions, please do not hesitate to contact me. [Sincerely,] : Sincerely, [DrDelmar  ___] : Dr. GAVIN [Courtesy Letter:] : I had the pleasure of seeing your patient, [unfilled], in my office today. [FreeTextEntry3] : Jorgito Land MD\par  Professor of Medicine\par  Chief of GI\par  Director IBD Program\par  [facility]\par   [DrDelmar ___] : Dr. GAVIN

## 2025-07-25 NOTE — ASSESSMENT
[FreeTextEntry1] : This is a 68 year old male with perianal fistulizing stricturing Crohn's Disease (diagnosed 1978, right sided colon resection in the past, currently off his prior treatment Pentasa, 6MP, and metronidazole for 20+ years), anal cancer (completed CT/RT in September 2014), and radiation proctitis, s/p proctocolectomy and ileostomy on 10/14/24 open lysis of adhesions by Dr. Chen and s/p reconstruction of perineum with gracilis myocutaneous flap and perineoplasty by Dr. Villalta.  Had been recovering nicely post op.   Appears to have suffered from  pSBO immediately after ileoscopy. Intra op the appearance was c/w microperforation and required additional resection --> SBR; post-op course c/b pelvic abscesses requiring 2 IR-guided drains; here today reporting very slow recovery, s/p 3 doses of UST.   #Crohn's disease s/p post proc perf leading to SBR - given evidence of ileitis on pathology and severity of history of Crohn's involving small bowel as well, pt was placed on Ustekinumab, now s/p 3 doses, for post op prevention - consider VCE/patency vs ileoscopy at least 6 mo from now after pt recovers to ensure no IBD recurrence  - avoid 6MP and anti-TNF given history of skin cancer - avoid ALBINO-inhibitors given significant cardiac history of triple bypass  - RD referral to assist with gaining weight since surgery  - place bacitracin on drain site - buproprion to assist with abd pain, early satiety  #History of anal cancer (likely Squamous Cell Ca) in 2014 - Reports surveillance via lab work q 3 months with Oncologist  - Oncologist: Dr. Peewee Lynch   #Anemia - B12 and iron deficient- being f/b Heme/Onc Dr. Lynch  #Mood -His wife feels he's depressed and we addressed whether he'd be open to discussion with therapist; he declined.   Follow up 1 mo via TEB   I, Dr. Jorgito Land, personally performed the evaluation and management (E/M) services for this established patient who presents today with (a) new problem(s)/exacerbation of (an) existing condition(s). That E/M includes conducting the clinically appropriate interval history &/or exam, assessing all new/exacerbated conditions, and establishing a new plan of care. Today, my YOLANDA, Suzanna TcRelatient, was here to observe my evaluation and management service for this new problem/exacerbated condition and follow the plan of care established by me going forward.

## 2025-07-25 NOTE — CONSULT LETTER
[Dear  ___] : Dear  [unfilled], [Consult Letter:] : I had the pleasure of evaluating your patient, [unfilled]. [Please see my note below.] : Please see my note below. [Consult Closing:] : Thank you very much for allowing me to participate in the care of this patient.  If you have any questions, please do not hesitate to contact me. [Sincerely,] : Sincerely, [DrDelmar  ___] : Dr. GAVIN [Courtesy Letter:] : I had the pleasure of seeing your patient, [unfilled], in my office today. [FreeTextEntry3] : Jorgiot Land MD\par  Professor of Medicine\par  Chief of GI\par  Director IBD Program\par  [facility]\par   [DrDelmar ___] : Dr. GAVIN

## 2025-07-25 NOTE — ASSESSMENT
[FreeTextEntry1] : This is a 68 year old male with perianal fistulizing stricturing Crohn's Disease (diagnosed 1978, right sided colon resection in the past, currently off his prior treatment Pentasa, 6MP, and metronidazole for 20+ years), anal cancer (completed CT/RT in September 2014), and radiation proctitis, s/p proctocolectomy and ileostomy on 10/14/24 open lysis of adhesions by Dr. Chen and s/p reconstruction of perineum with gracilis myocutaneous flap and perineoplasty by Dr. Villalta.  Had been recovering nicely post op.   Appears to have suffered from  pSBO immediately after ileoscopy. Intra op the appearance was c/w microperforation and required additional resection --> SBR; post-op course c/b pelvic abscesses requiring 2 IR-guided drains; here today reporting very slow recovery, s/p 3 doses of UST.   #Crohn's disease s/p post proc perf leading to SBR - given evidence of ileitis on pathology and severity of history of Crohn's involving small bowel as well, pt was placed on Ustekinumab, now s/p 3 doses, for post op prevention - consider VCE/patency vs ileoscopy at least 6 mo from now after pt recovers to ensure no IBD recurrence  - avoid 6MP and anti-TNF given history of skin cancer - avoid ALBINO-inhibitors given significant cardiac history of triple bypass  - RD referral to assist with gaining weight since surgery  - place bacitracin on drain site - buproprion to assist with abd pain, early satiety  #History of anal cancer (likely Squamous Cell Ca) in 2014 - Reports surveillance via lab work q 3 months with Oncologist  - Oncologist: Dr. Peewee Lynch   #Anemia - B12 and iron deficient- being f/b Heme/Onc Dr. Lynch  #Mood -His wife feels he's depressed and we addressed whether he'd be open to discussion with therapist; he declined.   Follow up 1 mo via TEB   I, Dr. Jorgito Land, personally performed the evaluation and management (E/M) services for this established patient who presents today with (a) new problem(s)/exacerbation of (an) existing condition(s). That E/M includes conducting the clinically appropriate interval history &/or exam, assessing all new/exacerbated conditions, and establishing a new plan of care. Today, my YOLANDA, Suzanna [employer], was here to observe my evaluation and management service for this new problem/exacerbated condition and follow the plan of care established by me going forward.

## 2025-07-25 NOTE — PHYSICAL EXAM
